# Patient Record
Sex: MALE | Race: OTHER | URBAN - METROPOLITAN AREA
[De-identification: names, ages, dates, MRNs, and addresses within clinical notes are randomized per-mention and may not be internally consistent; named-entity substitution may affect disease eponyms.]

---

## 2019-07-12 NOTE — PATIENT DISCUSSION
----- Message from Ana Maria Barroso MD sent at 2/1/2019  1:13 PM CST -----  Please notify pt of normal 1hr glucose-71. H/H 11/32- recommend taking a daily iron supplement  Thanks   RLL ectropian repair. 3 snip and irrigation.

## 2019-07-12 NOTE — PATIENT DISCUSSION
This visual field clearly demonstrated a minimum of 48% loss of upper field of vision OU, with upper lid skin in repose and elevated by taping of the lid to demonstrate potential correction. This field shows that taping the lids significantly improved this patient's superior field of vision by approximately 45%, OU.

## 2019-09-24 NOTE — PROCEDURE NOTE: SURGICAL
"<span style=""font-weight: bold;"">MR #:&nbsp;</span>&nbsp;[id number]<br /> <br /> <span style=""font-weight: bold;"">PREOPERATIVE DIAGNOSIS:</span>&nbsp;Dermatochalasis upper lids<br /> <br /> <span style=""font-weight: bold;"">POSTOPERATIVE DIAGNOSIS:</span>&nbsp;Same<br /> <br /> <span style=""font-weight: bold;"">OPERATIVE PROCEDURE:</span>&nbsp; Upper lid blepharoplasty both eyes<br /> <br /> <span style=""font-weight: bold;"">ANESTHESIA: &nbsp;</span> &nbsp;&nbsp; Local MAC<br /> <br /> <span style=""font-weight: bold;"">ESTIMATED BLOOD LOSS:</span> &nbsp;Minimal

## 2019-10-03 NOTE — PATIENT DISCUSSION
One week post op: great curve and shape, no infection, healing well, sutures intact and removed, use erythromycin QHS to upper eyelids x 7-10 days. Use Vitamin E oil/Skinuva QHS x 1 month to incisions after 10 days. Wear sunglasses and hat while outdoors. Avoid sun exposure. No restrictions in 5 days.

## 2022-02-10 ENCOUNTER — EMERGENCY VISIT (OUTPATIENT)
Dept: URBAN - METROPOLITAN AREA CLINIC 12 | Facility: CLINIC | Age: 32
End: 2022-02-10

## 2022-02-10 DIAGNOSIS — H16.041: ICD-10-CM

## 2022-02-10 PROCEDURE — 92002 INTRM OPH EXAM NEW PATIENT: CPT

## 2022-02-10 RX ORDER — ERYTHROMYCIN 5 MG/G: OINTMENT OPHTHALMIC EVERY EVENING

## 2022-02-10 RX ORDER — OFLOXACIN 3 MG/ML: 1 SOLUTION/ DROPS OPHTHALMIC

## 2022-02-10 ASSESSMENT — TONOMETRY
OS_IOP_MMHG: 20
OD_IOP_MMHG: 20

## 2022-02-10 ASSESSMENT — VISUAL ACUITY
OD_SC: CF 2FT
OD_PH: 20/80
OS_PH: 20/80
OS_SC: CF 2FT

## 2022-02-11 ENCOUNTER — FOLLOW UP (OUTPATIENT)
Dept: URBAN - METROPOLITAN AREA CLINIC 17 | Facility: CLINIC | Age: 32
End: 2022-02-11

## 2022-02-11 DIAGNOSIS — H16.041: ICD-10-CM

## 2022-02-11 DIAGNOSIS — H57.11: ICD-10-CM

## 2022-02-11 PROCEDURE — 92012 INTRM OPH EXAM EST PATIENT: CPT

## 2022-02-11 ASSESSMENT — VISUAL ACUITY
OD_SC: 20/400
OD_PH: 20/80
OS_PH: 20/80
OS_SC: 20/200

## 2022-02-11 ASSESSMENT — TONOMETRY
OD_IOP_MMHG: 10
OS_IOP_MMHG: 10